# Patient Record
Sex: FEMALE | Race: BLACK OR AFRICAN AMERICAN
[De-identification: names, ages, dates, MRNs, and addresses within clinical notes are randomized per-mention and may not be internally consistent; named-entity substitution may affect disease eponyms.]

---

## 2019-10-29 ENCOUNTER — HOSPITAL ENCOUNTER (OUTPATIENT)
Dept: HOSPITAL 74 - JASU-SURG | Age: 39
Discharge: HOME | End: 2019-10-29
Attending: OBSTETRICS & GYNECOLOGY
Payer: COMMERCIAL

## 2019-10-29 VITALS — DIASTOLIC BLOOD PRESSURE: 69 MMHG | TEMPERATURE: 98.4 F | SYSTOLIC BLOOD PRESSURE: 120 MMHG | HEART RATE: 82 BPM

## 2019-10-29 VITALS — BODY MASS INDEX: 40.3 KG/M2

## 2019-10-29 DIAGNOSIS — R93.89: ICD-10-CM

## 2019-10-29 DIAGNOSIS — E66.01: ICD-10-CM

## 2019-10-29 DIAGNOSIS — D64.9: ICD-10-CM

## 2019-10-29 DIAGNOSIS — D25.0: Primary | ICD-10-CM

## 2019-10-29 PROCEDURE — 0UB98ZX EXCISION OF UTERUS, VIA NATURAL OR ARTIFICIAL OPENING ENDOSCOPIC, DIAGNOSTIC: ICD-10-PCS | Performed by: OBSTETRICS & GYNECOLOGY

## 2019-10-29 PROCEDURE — 0UB98ZZ EXCISION OF UTERUS, VIA NATURAL OR ARTIFICIAL OPENING ENDOSCOPIC: ICD-10-PCS | Performed by: OBSTETRICS & GYNECOLOGY

## 2019-10-29 NOTE — OP
Operative Note





- Note:


Operative Date: 10/29/19


Pre-Operative Diagnosis: 40yo with fibroid uterus, morbid obesity, anemia, 

thich endometrium


Operation: Hysteroscopy/Myomectomy/Polypectomy/D&C


Findings: 





1. Overgrown Endometrium


2. Submucosal 1cm fibroid


3. Several polyps


4. Steeply anteflexed uterus


5. Uterus ~ 13cm


Post-Operative Diagnosis: Same as Pre-op


Surgeon: Hazel Grissom


Anesthesiologist/CRNA: Raffi Kramer


Anesthesia: MAC


Estimated Blood Loss (mls): 20


Instrument used (Debridements only): Symphion Hysteroscope


Drains & Tubes with Location: FD - 550cc


Drains, Volume Out (mls): 300


Fluid Volume Replaced (mls): 200


Operative Report Dictated: Yes

## 2019-10-29 NOTE — HP
History & Physical Update





- History


History: No Change





- Physical


Physical: No Change





- Assessment


Assessment: No Change





- Plan


Plan: No Change (H&P reviewed and consistent with 10/21/19 H&P Consent signed 

and witnessed All questions answered)

## 2019-10-30 NOTE — OP
DATE OF OPERATION:  

 

DATE OF DICTATION:  10/29/2019

 

PREOPERATIVE DIAGNOSIS:  A 39-year-old with fibroid uterus, morbid obesity, anemia,

and thickened endometrium. 

 

OPERATION:  Hysteroscopy, myomectomy, polypectomy, dilation and curettage.  

 

FINDINGS:  Overgrown endometrium.  Submucosal 1-cm fibroid.  Several endometrial

polyps.  Steeply anteflexed uterus.  Uterus enlarged approximately 13 cm in size.  

 

POSTOPERATIVE DIAGNOSIS:  A 39-year-old with fibroid uterus, morbid obesity, anemia,

and thickened endometrium. 

 

SURGEON:  Hazel Grissom M.D.

 

ANESTHESIOLOGIST:  Raffi Kramer M.D.  

 

ANESTHESIA:  MAC

 

DESCRIPTION OF OPERATIVE PROCEDURE:  After insuring informed consent, patient was

brought to the operating room where she was placed in dorsal lithotomy position. 

Perineum and vagina were prepped and draped in sterile fashion.  Bladder was emptied

with a sterile straight catheter.  300 mL of urine drained.  Hinton retractors were

placed into the vagina.  Cervix articulated with single-toothed tenaculum and

gradually dilated with increasing in size dilators.  The hysteroscope was introduced

under a very steep upward angle but without an overall difficulty with good

visualization of intrauterine cavity and was above described findings.  The

resectoscope was introduced under direct visualization through the Symphion

hysteroscope and all above findings were shaved and resected and suctioned and sent

to pathology.  Subsequently, excellent hemostasis was noted.  All instruments were

removed from uterus, cervix, and vagina.  Estimated blood loss was 20 mL.  Patient

received 200 mL of IV fluids.  Fluid deficit was 550 mL.  Patient was extubated and

brought to the recovery room in stable condition.  All sponge and instrument count

was correct x2.  

 

 

LEIGH [id number]

DD: 10/29/2019 23:13

DT: 10/30/2019 17:43

Job #:  [id number]

## 2019-10-30 NOTE — PATH
Surgical Pathology Report



Patient Name:  JOSIE PRETTY

Accession #:  [id number]

[facility]. Rec. #:  [id number]                                                        

   /Age/Gender:  1980 (Age: 39) / F

Account:  [id number]                                                          

             Location: [facility] SUR[facility]

Taken:  10/29/2019

Received:  10/29/2019

Reported:  10/30/2019

Physicians:  Hazel Grissom M.D.

  



Specimen(s) Received

 ENDOMETRIAL CURETTINGS POLYPS AND FIBROID R/O ENDOMETRIAL HYPERPLASA 





Clinical History

Endometrium thickened, morbid obesity, rule out endometrial hyperplasia







Final Diagnosis

ENDOMETRIAL CURETTING POLYPS AND FIBROIDS:

FRAGMENTS OF ENDOMETRIAL POLYP.

SMOOTH MUSCLE BUNDLES, CONSISTENT WITH SUBMUCOSAL LEIOMYOMA.

SEPARATELY SECRETORY TYPR ENDOMETRIUM.





***Electronically Signed***

Larisa Moncada M.D.





Gross Description

Received in formalin labeled "endometrial curetting polyp and fibroids," is a

4.0 x 2.6 x 0.4 cm aggregate of tan soft tissue fragments. The specimen is

entirely submitted in 4 cassettes.

/10/29/2019



saudi/10/29/2019

## 2022-11-22 ENCOUNTER — HOSPITAL ENCOUNTER (OUTPATIENT)
Dept: HOSPITAL 74 - JASU-SURG | Age: 42
Discharge: HOME | End: 2022-11-22
Attending: OBSTETRICS & GYNECOLOGY
Payer: COMMERCIAL

## 2022-11-22 VITALS — TEMPERATURE: 97.8 F | RESPIRATION RATE: 20 BRPM

## 2022-11-22 VITALS — BODY MASS INDEX: 39.5 KG/M2

## 2022-11-22 VITALS — SYSTOLIC BLOOD PRESSURE: 115 MMHG | HEART RATE: 75 BPM | DIASTOLIC BLOOD PRESSURE: 64 MMHG

## 2022-11-22 DIAGNOSIS — N92.0: ICD-10-CM

## 2022-11-22 DIAGNOSIS — D25.9: Primary | ICD-10-CM

## 2022-11-22 PROCEDURE — 0UB98ZZ EXCISION OF UTERUS, VIA NATURAL OR ARTIFICIAL OPENING ENDOSCOPIC: ICD-10-PCS | Performed by: OBSTETRICS & GYNECOLOGY

## 2022-11-22 PROCEDURE — 0UJD8ZZ INSPECTION OF UTERUS AND CERVIX, VIA NATURAL OR ARTIFICIAL OPENING ENDOSCOPIC: ICD-10-PCS | Performed by: OBSTETRICS & GYNECOLOGY

## 2022-11-22 PROCEDURE — 0UDB7ZX EXTRACTION OF ENDOMETRIUM, VIA NATURAL OR ARTIFICIAL OPENING, DIAGNOSTIC: ICD-10-PCS | Performed by: OBSTETRICS & GYNECOLOGY

## 2023-01-31 ENCOUNTER — HOSPITAL ENCOUNTER (INPATIENT)
Dept: HOSPITAL 74 - J2C | Age: 43
LOS: 2 days | Discharge: HOME | DRG: 743 | End: 2023-02-02
Attending: OBSTETRICS & GYNECOLOGY | Admitting: OBSTETRICS & GYNECOLOGY
Payer: COMMERCIAL

## 2023-01-31 VITALS — BODY MASS INDEX: 40.3 KG/M2

## 2023-01-31 DIAGNOSIS — N80.00: ICD-10-CM

## 2023-01-31 DIAGNOSIS — D64.9: ICD-10-CM

## 2023-01-31 DIAGNOSIS — N80.03: ICD-10-CM

## 2023-01-31 DIAGNOSIS — D25.2: ICD-10-CM

## 2023-01-31 DIAGNOSIS — N83.8: ICD-10-CM

## 2023-01-31 DIAGNOSIS — D25.1: Primary | ICD-10-CM

## 2023-01-31 DIAGNOSIS — N92.0: ICD-10-CM

## 2023-01-31 PROCEDURE — 0UT90ZL RESECTION OF UTERUS, SUPRACERVICAL, OPEN APPROACH: ICD-10-PCS | Performed by: OBSTETRICS & GYNECOLOGY

## 2023-01-31 PROCEDURE — 0UB50ZZ EXCISION OF RIGHT FALLOPIAN TUBE, OPEN APPROACH: ICD-10-PCS | Performed by: OBSTETRICS & GYNECOLOGY

## 2023-01-31 PROCEDURE — 0UB10ZZ EXCISION OF LEFT OVARY, OPEN APPROACH: ICD-10-PCS | Performed by: OBSTETRICS & GYNECOLOGY

## 2023-01-31 PROCEDURE — 0UB60ZZ EXCISION OF LEFT FALLOPIAN TUBE, OPEN APPROACH: ICD-10-PCS | Performed by: OBSTETRICS & GYNECOLOGY

## 2023-01-31 RX ADMIN — Medication SCH MG: at 12:25

## 2023-01-31 RX ADMIN — CEFAZOLIN SCH MLS/HR: 2 INJECTION, POWDER, FOR SOLUTION INTRAMUSCULAR; INTRAVENOUS at 18:22

## 2023-01-31 RX ADMIN — SODIUM CHLORIDE, POTASSIUM CHLORIDE, SODIUM LACTATE AND CALCIUM CHLORIDE SCH MLS/HR: 600; 310; 30; 20 INJECTION, SOLUTION INTRAVENOUS at 18:24

## 2023-01-31 RX ADMIN — SODIUM CHLORIDE, SODIUM GLUCONATE, SODIUM ACETATE, POTASSIUM CHLORIDE, AND MAGNESIUM CHLORIDE SCH: 526; 502; 368; 37; 30 INJECTION, SOLUTION INTRAVENOUS at 23:08

## 2023-01-31 RX ADMIN — ONDANSETRON PRN MG: 2 INJECTION INTRAMUSCULAR; INTRAVENOUS at 17:31

## 2023-02-01 LAB
ANISOCYTOSIS BLD QL: (no result)
BASOPHILS # BLD: 0.4 % (ref 0–2)
DEPRECATED RDW RBC AUTO: 27 % (ref 11.6–15.6)
EOSINOPHIL # BLD: 0.1 % (ref 0–4.5)
HCT VFR BLD CALC: 32.1 % (ref 32.4–45.2)
HGB BLD-MCNC: 10.1 GM/DL (ref 10.7–15.3)
LYMPHOCYTES # BLD: 15.7 % (ref 8–40)
MACROCYTES BLD QL: 0
MCH RBC QN AUTO: 20.6 PG (ref 25.7–33.7)
MCHC RBC AUTO-ENTMCNC: 31.6 G/DL (ref 32–36)
MCV RBC: 65.3 FL (ref 80–96)
MONOCYTES # BLD AUTO: 6.5 % (ref 3.8–10.2)
NEUTROPHILS # BLD: 77.3 % (ref 42.8–82.8)
PLATELET # BLD AUTO: 217 10^3/UL (ref 134–434)
PMV BLD: 9.4 FL (ref 7.5–11.1)
RBC # BLD AUTO: 4.91 M/MM3 (ref 3.6–5.2)
WBC # BLD AUTO: 13.4 K/MM3 (ref 4–10)

## 2023-02-01 RX ADMIN — CEFAZOLIN SCH MLS/HR: 2 INJECTION, POWDER, FOR SOLUTION INTRAMUSCULAR; INTRAVENOUS at 01:15

## 2023-02-01 RX ADMIN — Medication SCH: at 12:59

## 2023-02-01 RX ADMIN — ONDANSETRON PRN MG: 2 INJECTION INTRAMUSCULAR; INTRAVENOUS at 02:04

## 2023-02-01 RX ADMIN — SODIUM CHLORIDE, POTASSIUM CHLORIDE, SODIUM LACTATE AND CALCIUM CHLORIDE SCH MLS/HR: 600; 310; 30; 20 INJECTION, SOLUTION INTRAVENOUS at 01:18

## 2023-02-01 RX ADMIN — SODIUM CHLORIDE, SODIUM GLUCONATE, SODIUM ACETATE, POTASSIUM CHLORIDE, AND MAGNESIUM CHLORIDE SCH: 526; 502; 368; 37; 30 INJECTION, SOLUTION INTRAVENOUS at 08:36

## 2023-02-01 RX ADMIN — CHLORTHALIDONE SCH MG: 25 TABLET ORAL at 09:14

## 2023-02-01 RX ADMIN — IBUPROFEN PRN MG: 600 TABLET, FILM COATED ORAL at 10:04

## 2023-02-01 RX ADMIN — ENOXAPARIN SODIUM SCH MG: 40 INJECTION SUBCUTANEOUS at 10:05

## 2023-02-01 RX ADMIN — CEFAZOLIN SCH MLS/HR: 2 INJECTION, POWDER, FOR SOLUTION INTRAMUSCULAR; INTRAVENOUS at 10:10

## 2023-02-02 VITALS — SYSTOLIC BLOOD PRESSURE: 137 MMHG | RESPIRATION RATE: 18 BRPM | DIASTOLIC BLOOD PRESSURE: 82 MMHG | HEART RATE: 89 BPM

## 2023-02-02 VITALS — TEMPERATURE: 98.6 F

## 2023-02-02 RX ADMIN — ENOXAPARIN SODIUM SCH MG: 40 INJECTION SUBCUTANEOUS at 10:24

## 2023-02-02 RX ADMIN — ENOXAPARIN SODIUM SCH MG: 40 INJECTION SUBCUTANEOUS at 10:48

## 2023-02-02 RX ADMIN — CHLORTHALIDONE SCH MG: 25 TABLET ORAL at 10:24

## 2023-02-02 RX ADMIN — IBUPROFEN PRN MG: 600 TABLET, FILM COATED ORAL at 05:18
